# Patient Record
Sex: FEMALE | Race: WHITE
[De-identification: names, ages, dates, MRNs, and addresses within clinical notes are randomized per-mention and may not be internally consistent; named-entity substitution may affect disease eponyms.]

---

## 2019-02-14 ENCOUNTER — HOSPITAL ENCOUNTER (EMERGENCY)
Dept: HOSPITAL 7 - FB.ED | Age: 44
Discharge: HOME | End: 2019-02-14
Payer: MEDICAID

## 2019-02-14 VITALS — DIASTOLIC BLOOD PRESSURE: 78 MMHG | SYSTOLIC BLOOD PRESSURE: 143 MMHG

## 2019-02-14 DIAGNOSIS — G43.009: Primary | ICD-10-CM

## 2019-02-14 DIAGNOSIS — Z88.2: ICD-10-CM

## 2019-02-14 PROCEDURE — 96375 TX/PRO/DX INJ NEW DRUG ADDON: CPT

## 2019-02-14 PROCEDURE — 96361 HYDRATE IV INFUSION ADD-ON: CPT

## 2019-02-14 PROCEDURE — 96374 THER/PROPH/DIAG INJ IV PUSH: CPT

## 2019-02-14 PROCEDURE — 99283 EMERGENCY DEPT VISIT LOW MDM: CPT

## 2019-02-14 NOTE — EDM.PDOC
ED HPI GENERAL MEDICAL PROBLEM





- General


Chief Complaint: Headache


Stated Complaint: MIGRAINE


Time Seen by Provider: 19 04:10


Source of Information: Reports: Patient


History Limitations: Reports: No Limitations





- History of Present Illness


INITIAL COMMENTS - FREE TEXT/NARRATIVE: 





Jane is a. 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 

works at [employer] and had the onset at 1500 on 19 of 8/10 generalize 

headache with more involvement right side and sharp pitchfork-like feeling in 

her right eye with associated feeling like her head was in vice photophobia. 

She did not have paresis phonophobia weakness nausea vomiting  fever chills 

cough ataxia  fortification spectra or scintillating scotomas. Her headache is 

persistent throughout surgery morning since 1500 yesterday (13 hours). 





- Related Data


 Allergies











Allergy/AdvReac Type Severity Reaction Status Date / Time


 


Sulfa (Sulfonamide Allergy Unknown unknown Verified 19 04:14





Antibiotics)     











Home Meds: 


 Home Meds





SUMAtriptan [Imitrex] 6 mg SQ ASDIRECTED 08/30/15 [History]











Social & Family History





- Family History


Family Medical History: Noncontributory





- Tobacco Use


Smoking Status *Q: Never Smoker


Second Hand Smoke Exposure: No





- Caffeine Use


Caffeine Use: Reports: Coffee





- Alcohol Use


Days Per Week of Alcohol Use: 2


Number of Drinks Per Day: 2


Total Drinks Per Week: 4





- Recreational Drug Use


Recreational Drug Use: No





ED ROS GENERAL





- Review of Systems


Review Of Systems: ROS reveals no pertinent complaints other than HPI.





- Physical Exam


Exam: See Below


Text/Narrative:: 





Patient is a well muscled well nourished woman with moderate photophobia drove 

herself to the ED this early morning.


Exam Limited By: No Limitations


General Appearance: Alert, Severe Distress, Other (She is very stoic woman)


Eye Exam: Bilateral Eye: Normal Inspection, Other (Normal EOMs and pupillary 

constriction and dilation response to light moderate photophobia)


Ears: Normal External Exam


Nose: Normal Inspection, Normal Mucosa


Throat/Mouth: Normal Inspection, Normal Lips, Normal Teeth, Normal Gums, Normal 

Oropharynx, Normal Voice


Head Exam: Atraumatic, Normocephalic


Neck: Normal Inspection, Supple, Non-Tender, Full Range of Motion, Other (No 

bruits)


Respiratory/Chest: No Respiratory Distress, Lungs Clear, Normal Breath Sounds, 

No Accessory Muscle Use, Chest Non-Tender


Cardiovascular: Normal Peripheral Pulses, Regular Rate, Rhythm, No Edema, No 

Gallop, No JVD, No Murmur, No Rub


GI/Abdominal: Normal Bowel Sounds, Soft, Non-Tender, No Organomegaly, No 

Distention, No Mass


 (Female) Exam: Deferred


Rectal (Female) Exam: Deferred


Neuro Exam (Abbreviated): Alert, Oriented, CN II-XII Intact, Normal Cognition, 

Normal Gait, Normal Reflexes, No Motor/Sensory Deficits


DTR: 1+: Bicep (R), Bicep (L), Patella (R), Patella (L), Achilles (R), Achilles 

(L)


Back Exam: Normal Inspection, Full Range of Motion


Extremities: Normal Inspection, Normal Range of Motion, Non-Tender, No Pedal 

Edema, Normal Capillary Refill


Psychiatric: Normal Affect, Normal Mood


Skin Exam: Warm, Dry, Intact, Normal Color





Course





- Vital Signs


Last Recorded V/S: 





 Last Vital Signs











Temp  36.8 C   19 04:10


 


Pulse  95   19 04:10


 


Resp  18   19 04:10


 


BP  153/102 H  19 04:10


 


Pulse Ox  100   19 04:10














- Orders/Labs/Meds


Orders: 





 Active Orders 24 hr











 Category Date Time Status


 


 Sodium Chloride 0.9% [Normal Saline] 1,000 ml Med  19 04:45 Active





 IV ASDIRECTED   








 Medication Orders





Sodium Chloride (Normal Saline)  1,000 mls @ 999 mls/hr IV ASDIRECTED MIRNA








Meds: 





Medications











Generic Name Dose Route Start Last Admin





  Trade Name Freq  PRN Reason Stop Dose Admin


 


Sodium Chloride  1,000 mls @ 999 mls/hr  19 04:45  





  Normal Saline  IV   





  ASDIRECTED MIRNA   





     





     





     





     














Discontinued Medications














Generic Name Dose Route Start Last Admin





  Trade Name Freq  PRN Reason Stop Dose Admin


 


Diphenhydramine HCl  50 mg  19 04:41  





  Benadryl  IVPUSH  19 04:42  





  ONETIME ONE   





     





     





     





     


 


Acetaminophen 1,000 mg/ Premix  100 mls @ 400 mls/hr  19 04:40  





  IV  19 04:54  





  NOW ONE   





     





     





     





     


 


Ketorolac Tromethamine  30 mg  19 04:41  





  Toradol  IVPUSH  19 04:42  





  ONETIME ONE   





     





     





     





     


 


Metoclopramide HCl  10 mg  19 04:41  





  Reglan  IVPUSH  19 04:42  





  ONETIME ONE   





     





     





     





     














Departure





- Departure


Time of Disposition: 05:00 (Migraine headache with photophobia dizziness 

dyesthesia or paresis. Good response to pregaln, benadryl, toradol, Ofirmev and 

1000 ml NS.)


Disposition: Home, Self-Care 01


Condition: Good


Clinical Impression: 


Migraine


Qualifiers:


 Migraine type: without aura Status migrainosus presence: without status 

migrainosus Intractability: not intractable Qualified Code(s): G43.009 - 

Migraine without aura, not intractable, without status migrainosus








- Discharge Information


*PRESCRIPTION DRUG MONITORING PROGRAM REVIEWED*: Not Applicable


*COPY OF PRESCRIPTION DRUG MONITORING REPORT IN PATIENT REDD: Not Applicable


Referrals: 


Eric Valdovinos PA [Primary Care Provider] - 


Forms:  ED Department Discharge


Additional Instructions: 


be sure to take stat Imitrex at the onset the headache





as a back up you  can take concurrently stat 





1000 mg tylenol, 10 mg of reglan (metaclopramide)a, 10 mg of toradol, and 50 mg 

of benadryl orally





now that you have the Imitrex shot prescribed if the the oraly Imitrex and the 

other med do not work then go to your Imitrex shot





follow up with our MD/health care provider in the next 7 days, earlier if if 

you are worse





be sure to get good sleep and to drink 2 quarts of fluid a day





- My Orders


Last 24 Hours: 





My Active Orders





19 04:45


Sodium Chloride 0.9% [Normal Saline] 1,000 ml IV ASDIRECTED 














- Assessment/Plan


Last 24 Hours: 





My Active Orders





19 04:45


Sodium Chloride 0.9% [Normal Saline] 1,000 ml IV ASDIRECTED